# Patient Record
Sex: FEMALE | Race: WHITE | NOT HISPANIC OR LATINO | Employment: UNEMPLOYED | URBAN - METROPOLITAN AREA
[De-identification: names, ages, dates, MRNs, and addresses within clinical notes are randomized per-mention and may not be internally consistent; named-entity substitution may affect disease eponyms.]

---

## 2020-04-23 ENCOUNTER — OFFICE VISIT (OUTPATIENT)
Dept: PODIATRY | Facility: CLINIC | Age: 55
End: 2020-04-23
Payer: COMMERCIAL

## 2020-04-23 ENCOUNTER — HOSPITAL ENCOUNTER (OUTPATIENT)
Dept: RADIOLOGY | Facility: HOSPITAL | Age: 55
Discharge: HOME | End: 2020-04-23
Attending: PODIATRIST
Payer: COMMERCIAL

## 2020-04-23 VITALS
WEIGHT: 200 LBS | OXYGEN SATURATION: 98 % | HEIGHT: 64 IN | SYSTOLIC BLOOD PRESSURE: 137 MMHG | BODY MASS INDEX: 34.15 KG/M2 | DIASTOLIC BLOOD PRESSURE: 75 MMHG | TEMPERATURE: 98 F | HEART RATE: 66 BPM | RESPIRATION RATE: 18 BRPM

## 2020-04-23 DIAGNOSIS — M72.2 PLANTAR FASCIITIS, RIGHT: Primary | ICD-10-CM

## 2020-04-23 DIAGNOSIS — G89.29 HEEL PAIN, CHRONIC, RIGHT: ICD-10-CM

## 2020-04-23 DIAGNOSIS — M79.671 PAIN OF RIGHT HEEL: ICD-10-CM

## 2020-04-23 DIAGNOSIS — M25.774 OSTEOPHYTE OF RIGHT FOOT: ICD-10-CM

## 2020-04-23 DIAGNOSIS — M79.671 HEEL PAIN, CHRONIC, RIGHT: ICD-10-CM

## 2020-04-23 PROCEDURE — 73630 X-RAY EXAM OF FOOT: CPT | Mod: TC,FY,RT

## 2020-04-23 PROCEDURE — 99203 PR OFFICE/OUTPT VISIT, NEW, LEVL III, 30-44 MIN: ICD-10-PCS | Mod: 25,S$GLB,, | Performed by: PODIATRIST

## 2020-04-23 PROCEDURE — 99999 PR PBB SHADOW E&M-NEW PATIENT-LVL III: CPT | Mod: PBBFAC,,, | Performed by: PODIATRIST

## 2020-04-23 PROCEDURE — 99999 PR PBB SHADOW E&M-NEW PATIENT-LVL III: ICD-10-PCS | Mod: PBBFAC,,, | Performed by: PODIATRIST

## 2020-04-23 PROCEDURE — 73630 XR FOOT COMPLETE 3 VIEW RIGHT: ICD-10-PCS | Mod: 26,RT,, | Performed by: RADIOLOGY

## 2020-04-23 PROCEDURE — 96372 THER/PROPH/DIAG INJ SC/IM: CPT | Mod: RT,S$GLB,, | Performed by: PODIATRIST

## 2020-04-23 PROCEDURE — 99203 OFFICE O/P NEW LOW 30 MIN: CPT | Mod: 25,S$GLB,, | Performed by: PODIATRIST

## 2020-04-23 PROCEDURE — 96372 PR INJECTION,THERAP/PROPH/DIAG2ST, IM OR SUBCUT: ICD-10-PCS | Mod: RT,S$GLB,, | Performed by: PODIATRIST

## 2020-04-23 PROCEDURE — 73630 X-RAY EXAM OF FOOT: CPT | Mod: 26,RT,, | Performed by: RADIOLOGY

## 2020-04-23 RX ORDER — KETOROLAC TROMETHAMINE 30 MG/ML
30 INJECTION, SOLUTION INTRAMUSCULAR; INTRAVENOUS
Status: COMPLETED | OUTPATIENT
Start: 2020-04-23 | End: 2020-04-23

## 2020-04-23 RX ADMIN — KETOROLAC TROMETHAMINE 30 MG: 30 INJECTION, SOLUTION INTRAMUSCULAR; INTRAVENOUS at 11:04

## 2020-04-25 NOTE — PROGRESS NOTES
Subjective:       Patient ID: Anitha Ruvalcaba is a 55 y.o. female.    Chief Complaint: Heel Pain  Patient presents with complaint of right heel pain for about 1 month.  Patient reports it is on a bottom, back and outside of the heel.  She has been staying off her foot as much as possible, taking ibuprofen daily, applied ice last night for the 1st time, not much help.  Reports staying off  Her foot is the most beneficial.  Reports some discomfort in the morning but by the end of the day has increased pain, sharp in very tender, makes walking difficult.   Patient reports she is in good health with no current medical conditions, denies taking any prescription medications      History reviewed. No pertinent past medical history.  Past Surgical History:   Procedure Laterality Date    Gastric Sleve       Family History   Problem Relation Age of Onset    Hypertension Mother     Breast cancer Mother     Cancer Father     Hypertension Father     Diabetes Brother      Social History     Socioeconomic History    Marital status:      Spouse name: Not on file    Number of children: Not on file    Years of education: Not on file    Highest education level: Not on file   Occupational History    Occupation: House wife   Social Needs    Financial resource strain: Not on file    Food insecurity:     Worry: Not on file     Inability: Not on file    Transportation needs:     Medical: Not on file     Non-medical: Not on file   Tobacco Use    Smoking status: Never Smoker    Smokeless tobacco: Never Used   Substance and Sexual Activity    Alcohol use: Yes     Comment: occaonially     Drug use: Never    Sexual activity: Yes     Partners: Male   Lifestyle    Physical activity:     Days per week: Not on file     Minutes per session: Not on file    Stress: Not on file   Relationships    Social connections:     Talks on phone: Not on file     Gets together: Not on file     Attends [employer] service: Not on file  "    Active member of club or organization: Not on file     Attends meetings of clubs or organizations: Not on file     Relationship status: Not on file   Other Topics Concern    Not on file   Social History Narrative    Not on file       No current outpatient medications on file.     No current facility-administered medications for this visit.      Review of patient's allergies indicates:  No Known Allergies    Review of Systems   Constitutional: Negative for fever.   Respiratory: Negative for cough and shortness of breath.    Cardiovascular: Negative for leg swelling.   All other systems reviewed and are negative.      Objective:      Vitals:    04/23/20 1031   BP: 137/75   Pulse: 66   Resp: 18   Temp: 97.6 °F (36.4 °C)   TempSrc: Oral   SpO2: 98%   Weight: 90.7 kg (200 lb)   Height: 5' 4" (1.626 m)     Physical Exam   Constitutional: She appears well-developed and well-nourished. No distress.   Cardiovascular:   Pulses:       Dorsalis pedis pulses are 2+ on the right side, and 2+ on the left side.        Posterior tibial pulses are 2+ on the right side, and 2+ on the left side.   Pulmonary/Chest: Effort normal.   Musculoskeletal:        Right foot: There is normal range of motion and no deformity.        Left foot: There is normal range of motion and no deformity.   Feet:   Right Foot:   Skin Integrity: Negative for erythema.   Left Foot:   Skin Integrity: Negative for erythema.   Skin: Capillary refill takes 2 to 3 seconds.   Psychiatric: She has a normal mood and affect.   Nursing note and vitals reviewed.  Vascular         Normal CFT bilateral   No lower extremity edema bilateral   Pedal skin temperature and color are normal bilateral     Integumentary   Unremarkable bilateral feet          Neurological   Gross sensation intact bilateral feet     Musculoskeletal   Muscle Strength/Testing and Tone:  Intact, normal tone bilateral   Joints, Bones, and Muscles: Pain plantar fascial insertion and lateral band " right foot  Normal with normal ROM        Walks well unassisted        Presents in appropriate shoes      X-Ray Foot Complete Right    Details     Reading Physician Reading Date Result Priority   Max Cortez MD 4/23/2020 Routine      Narrative     EXAMINATION:  XR FOOT COMPLETE 3 VIEW RIGHT    CLINICAL HISTORY:  . Pain in right foot    TECHNIQUE:  AP, lateral, and oblique views of the right foot were performed.    COMPARISON:  None    FINDINGS:  Mild degenerative osteoarthrosis involving the 1st MTP joint and the IP joints of the digits.  The remaining joint spaces are preserved.    Tarsal bones are intact with mild degenerative osteoarthrosis.  Normal tarsometatarsal alignment.  Prominent osteophyte off the proximal head of the 1st metatarsal may be posttraumatic in etiology.    Very small dorsal and plantar calcaneal spurs.      Impression       1. Prominent osteophyte off the proximal head of the 1st metatarsal.  2. Very small calcaneal spurs.                Assessment:       1. Plantar fasciitis, right    2. Pain of right heel    3. Osteophyte of right foot        Plan:          1 CC 30 MG IM TORADOL RIGHT      Reviewed xrays with patient, spur base of first metatarsal, not related to current pain and has been present for a while   Reviewed plantar fasciitis at length with patient.   Explained this is a strain/sprain of the supportive band through the arch on the bottom of the foot and needs to be supported properly along with treatment for inflammation. Explained bracing of the plantar fascia through appropriate arch support is crucial.  We discussed appropriate arch support  and shoes, how to gradually adjust to wearing arch supports comfortably, always removing insoles from the shoes to accommodate supports.  Advised once comfortable should be worn at all times of weight-bearing until pain resolves, then any time for excessive activity, exercise, prolonged walking or standing.   We reviewed appropriate  shoes for indoors, no flat shoes or walking barefoot.  Reviewed stretching at length and advised patient this is an important part of treatment to prevent recurrence.    Reviewed ice/cool therapy and frequency this should be performed.  Recommended IM Toradol injection.  We discussed this anti-inflammatory, effectiveness in decreasing inflammation, potential side effects and length of time injection may be beneficial  Reviewed over-the-counter anti-inflammatory  Which she can start in 2 days following the duration of Toradol injection.  Advised patient taking over-the-counter anti-inflammatory as directed just once in the morning or at noon with food until remaining discomfort is resolved can be very helpful, no longer than 1 week, discontinue if stomach upset   Patient/family were in understanding and agreement with treatment plan.  I counseled the patient on their conditions, implications and medical management.  Instructed patient/family to contact the office with any changes, questions, concerns, worsening of symptoms.   Total face-to-face time, exam, assessment, treatment, discussion, documentation 30 minutes, more than half this time spent on consultation and coordination of care.  Follow up 2 weeks if not almost resolved    This note was created using M*Modal voice recognition software that occasionally misinterpreted phrases or words.

## 2020-10-26 DIAGNOSIS — Z52.001 STEM CELL DONOR: Primary | ICD-10-CM

## 2020-10-26 NOTE — PROGRESS NOTES
"Spoke with Anitha potential donor for Vito Weiner today via face to face discussion. Discussed the fact that her brother is in need of a stem cell transplant.  Anitha said that she was interested in possibly being the donor for her brother's transplant. Briefly discussed the donation process. Explained to Anitha that if she would match her brother, that her commitment to donate is very important and that she has the right to change her mind. Also explained however, that a late decision not to donate can be life threatening to the patient. Explained that she should think seriously about her commitment before she has his tissue typing drawn, and if she feels uncomfortable moving forward, that it is okay for her to say "no." Also informed Anitha that if she is a  match for her brother, that we will contact her and ask if she is willing to donate. If she agreed to proceed. I completed the health questions at this visit at potential donor's request. I explained that in the future I would schedule a physician appointment and more testing if she were to be a match. She will receive a physical examination to be sure it is safe for her to donate and that her donation will provide the best possible outcome for the patient. We will follow medical guidelines that protect her health as a potential donor as well as the health of the transplant patient. claudine was given the opportunity to ask questions.  She wishes to visit [facility] to have her blood drawn.     "

## 2020-11-03 ENCOUNTER — APPOINTMENT (OUTPATIENT)
Dept: LAB | Facility: HOSPITAL | Age: 55
End: 2020-11-03
Attending: INTERNAL MEDICINE
Payer: COMMERCIAL